# Patient Record
Sex: MALE | Race: WHITE
[De-identification: names, ages, dates, MRNs, and addresses within clinical notes are randomized per-mention and may not be internally consistent; named-entity substitution may affect disease eponyms.]

---

## 2018-07-26 ENCOUNTER — HOSPITAL ENCOUNTER (EMERGENCY)
Dept: HOSPITAL 80 - FED | Age: 53
Discharge: HOME | End: 2018-07-26
Payer: MEDICAID

## 2018-07-26 VITALS — DIASTOLIC BLOOD PRESSURE: 67 MMHG | SYSTOLIC BLOOD PRESSURE: 145 MMHG

## 2018-07-26 DIAGNOSIS — S63.502A: ICD-10-CM
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
## 2018-07-26 DIAGNOSIS — S52.531A: Primary | ICD-10-CM
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
## 2018-07-26 PROCEDURE — A4565 SLINGS: HCPCS

## 2018-07-26 PROCEDURE — 0PSJXZZ REPOSITION LEFT RADIUS, EXTERNAL APPROACH: ICD-10-PCS

## 2018-07-26 NOTE — EDPHY
General


Time Seen by Provider: 07/26/18 21:01


Narrative: 





CHIEF COMPLAINT: 


Left wrist injury





HISTORY OF PRESENT ILLNESS: 


Patient presents with complaints of left wrist pain after fall while playing 

soccer with children earlier today.  He says he fell backwards on outward 

stretched left upper extremity.  He felt a sudden onset of pain left wrist.  He 

has significant pain with movement that radiates up to the proximal forearm.  

He denies any head strike or loss of consciousness.  No headache, chest, back 

or abdominal pain.  He has no numbness or tingling to the left arm.  He does 

have some mild right-sided soft tissue neck pain.  He has no injury elsewhere.  

Right-hand dominant.  No other associated complaints or modifying factors.





DOMINANT EXTREMITY:


Right-hand dominant





ESTABLISHED ORTHOPEDIST:


None





REVIEW OF SYSTEMS:


Ten systems reviewed and are negative unless otherwise noted in the HPI








PAST MEDICAL HISTORY: 


Uncomplicated





PAST SURGICAL HISTORY:


No surgical history





SOCIAL HISTORY:


Nonsmoker.  Lives here with his spouse and 2 children independently.





FAMILY HISTORY:


Noncontributory





EXAMINATION


General Appearance:  Alert, no distress


HEENT:  Normocephalic.  Atraumatic pupils equal and reactive.  No signs of 

trauma.  EOM symmetric and painless.


Neck:  Midline trachea.  Normal appearance.  Supple nontender in all planes.


Cardiovascular:  Symmetric radial pulses 2+.  There is brisk cap refill in the 

fingers of the left hand.


Neurological:  A&O, light sensory symmetric in the upper extremities including 

the radial, ulnar and median distributions.  There is normal two-point 

sensation in the fingers left hand.  No wrist drop.  Interossei strength 

symmetric.


Skin:  Warm and dry, no rash.  No puncture, laceration or ecchymosis.


Extremities:  There is tenderness, swelling and deformity to the left wrist 

with dorsal angulation at the radial head.  Unable to test range of motion due 

to pain and known fracture on x-ray.  Range of motion of the left shoulder and 

elbow is painless and symmetric to the right upper extremity.  All compartments 

are soft and left upper extremity without any signs of compartment syndrome.


Psychiatric:  Mood and affect normal








DIFFERENTIAL DIAGNOSES:


Including but not limited to sprain, strain, fracture, dislocation, subluxation

, fracture dislocation








MDM:


9:05 p.m.


Acute, comminuted angulated fracture of the left distal radius.  He is neuro 

intact distally with no signs of acute carpal tunnel.  He has no injury to the 

elbow or shoulder.  He may have a soft tissue of the neck without any evidence 

of bony injury.  I do feel that he would benefit from hematoma block with 

attempted reduction of the wrist.  He is comfortable this.





9:30 p.m.


I have administered hematoma block in the patient has significant improvement 

in his pain already.





9:50 p.m.


I have performed closed reduction of the left wrist with significant 

improvement in alignment by visualization and C-arm fluoroscopy.  He remains 

neuro intact.  He will be placed in sugar-tong splint.





10:15 p.m.


Plain film post reduction as read by me, without radiologist, reveals some 

improvement but not anatomic alignment.  Patient does have significant 

improvement of his pain and I do feel this is reasonable position of comfort to 

splinting.





10:30 p.m.


Sugar-tong splint placed and he remains neuro intact distally.  He has a sling 

for comfort.  We discussed pain medication, ice, elevation over counter anti-

inflammatories.  I provided the on-call orthopedist for him to follow up with 

her early next week.  We discussed ED precautions for worsening pain, numbness, 

weakness or wrist drop.  He is stable for discharge home.





PROCEDURE: Closed reduction of left wrist


Consent: Verbal


Location:  Left wrist


Anesthesia:  Hematoma block as below


Procedure:  After good anesthesia, the left wrist was placed into finger traps 

with traction.  I was then able to perform traction counter traction with volar 

manipulation of the wrist.  There was palpable reduction and good anatomic 

alignment visually.  Tolerated well without difficulty.


Complications:  None


Post-reduction film:  Pending





PROCEDURE:  Hematoma block


Indication:  Left distal radius fracture


Consent:  Verbal


Location:  Left wrist


Anesthesia: Lidocaine 1% plain, 0.25% Marcaine plain, 10 mL


Description:  Base of the left distal radius fracture was palpated.  Using 

aseptic technique, The above was infused without difficulty.  Tolerated well.  

Good anesthesia.


Complications: None








SUPERVISION:


This patient was independently evaluated without direct involvement of or 

examination by the attending physician. 





ED Precautions: 


Worsening pain. Erythema, edema, cyanosis, pallor, paresthesia or anesthesia.








- Diagnostics


Imaging Results: 


 Imaging Impressions





Wrist X-Ray  07/26/18 20:24


Impression: Comminuted, likely intra-articular fracture of the distal radius.


 


 














- History


Smoking Status: Never smoked





- Objective


Vital Signs: 


 Initial Vital Signs











Temperature (C)  97.5 F   07/26/18 20:05


 


Heart Rate  82   07/26/18 20:05


 


Respiratory Rate  16   07/26/18 20:05


 


Blood Pressure  107/75   07/26/18 20:05


 


O2 Sat (%)  95   07/26/18 20:05








 











O2 Delivery Mode               Room Air














Allergies/Adverse Reactions: 


 





Penicillins Allergy (Verified 07/26/18 20:08)


 








Home Medications: 














 Medication  Instructions  Recorded


 


Bactrim DS  07/26/18


 


oxyCODONE HCL/ACETAMINOPHEN 1 each PO Q4-6PRN PRN #15 tablet 07/26/18





[Percocet 5-325 mg Tablet]  











Medications Given: 


 








Discontinued Medications





Oxycodone/Acetaminophen (Percocet 5/325)  2 tab PO EDNOW ONE


   Stop: 07/26/18 21:13


   Last Admin: 07/26/18 21:21 Dose:  2 tab


Oxycodone/Acetaminophen (Percocet 5/325mg Prepack#4)  1 btl TAKEHOME EDNOW ONE


   Stop: 07/26/18 21:13


   Last Admin: 07/26/18 21:22 Dose:  1 btl








Departure





- Departure


Disposition: Home, Routine, Self-Care


Clinical Impression: 


Distal radius fracture, right


Qualifiers:


 Encounter type: initial encounter Fracture type: closed Fracture morphology: 

Colles' Qualified Code(s): S52.531A - Colles' fracture of right radius, initial 

encounter for closed fracture





Left wrist sprain


Qualifiers:


 Encounter type: initial encounter Qualified Code(s): S63.502A - Unspecified 

sprain of left wrist, initial encounter





Condition: Good


Instructions:  Oxycodone/Acetaminophen (By mouth), Wrist Fracture in Adults (ED)

, ORIF of a Wrist Fracture (DC)


Additional Instructions: 


1. Keep your splint in place at all times until seen by orthopedist


2. Contact the on-call orthopedist Dr. Humble Gonzalez tomorrow morning for 

outpatient definitive care


3. Ice, elevation as needed through your splint


4. Ibuprofen 400-600 mg every 6-8 hours as needed for pain


5. Pain medication as prescribed as needed


6. ED precautions for worsening pain, numbness, tingling, wrist drop 


Referrals: 


JANNY BLOUNT [Primary Care Provider] - As per Instructions


Enrike Gonzalez MD [Medical Doctor] - As per Instructions


Prescriptions: 


oxyCODONE HCL/ACETAMINOPHEN [Percocet 5-325 mg Tablet] 1 each PO Q4-6PRN PRN #

15 tablet


 PRN Reason: Pain, Breakthrough